# Patient Record
Sex: FEMALE | Race: BLACK OR AFRICAN AMERICAN | NOT HISPANIC OR LATINO | URBAN - METROPOLITAN AREA
[De-identification: names, ages, dates, MRNs, and addresses within clinical notes are randomized per-mention and may not be internally consistent; named-entity substitution may affect disease eponyms.]

---

## 2024-04-24 ENCOUNTER — APPOINTMENT (OUTPATIENT)
Dept: URGENT CARE | Facility: MEDICAL CENTER | Age: 24
End: 2024-04-24

## 2024-04-24 ENCOUNTER — APPOINTMENT (OUTPATIENT)
Dept: LAB | Facility: MEDICAL CENTER | Age: 24
End: 2024-04-24

## 2024-04-24 DIAGNOSIS — Z13.9 ENCOUNTER FOR HEALTH-RELATED SCREENING: ICD-10-CM

## 2024-04-24 DIAGNOSIS — Z13.9 ENCOUNTER FOR HEALTH-RELATED SCREENING: Primary | ICD-10-CM

## 2024-04-24 LAB — RUBV IGG SERPL IA-ACNC: 23.6 IU/ML

## 2024-04-24 PROCEDURE — 86480 TB TEST CELL IMMUN MEASURE: CPT

## 2024-04-24 PROCEDURE — 36415 COLL VENOUS BLD VENIPUNCTURE: CPT

## 2024-04-24 PROCEDURE — 86762 RUBELLA ANTIBODY: CPT

## 2024-04-24 PROCEDURE — 86735 MUMPS ANTIBODY: CPT

## 2024-04-24 PROCEDURE — 86787 VARICELLA-ZOSTER ANTIBODY: CPT

## 2024-04-24 PROCEDURE — 86706 HEP B SURFACE ANTIBODY: CPT

## 2024-04-24 PROCEDURE — 86765 RUBEOLA ANTIBODY: CPT

## 2024-04-24 PROCEDURE — 90715 TDAP VACCINE 7 YRS/> IM: CPT

## 2024-04-25 LAB
HBV SURFACE AB SER-ACNC: <3 MIU/ML
MEV IGG SER QL IA: NORMAL
MUV IGG SER QL IA: NORMAL
VZV IGG SER QL IA: ABNORMAL

## 2024-04-27 LAB
GAMMA INTERFERON BACKGROUND BLD IA-ACNC: 0.01 IU/ML
M TB IFN-G BLD-IMP: NEGATIVE
M TB IFN-G CD4+ BCKGRND COR BLD-ACNC: 0 IU/ML
M TB IFN-G CD4+ BCKGRND COR BLD-ACNC: 0 IU/ML
MITOGEN IGNF BCKGRD COR BLD-ACNC: 9.99 IU/ML

## 2024-10-31 ENCOUNTER — HOSPITAL ENCOUNTER (EMERGENCY)
Facility: HOSPITAL | Age: 24
Discharge: HOME | End: 2024-10-31
Attending: EMERGENCY MEDICINE | Admitting: EMERGENCY MEDICINE
Payer: COMMERCIAL

## 2024-10-31 VITALS
DIASTOLIC BLOOD PRESSURE: 68 MMHG | RESPIRATION RATE: 16 BRPM | OXYGEN SATURATION: 100 % | SYSTOLIC BLOOD PRESSURE: 130 MMHG | WEIGHT: 167.33 LBS | HEART RATE: 69 BPM | TEMPERATURE: 98.4 F

## 2024-10-31 DIAGNOSIS — G43.109 OCULAR MIGRAINE: Primary | ICD-10-CM

## 2024-10-31 LAB
ATRIAL RATE: 76 BPM
GLUCOSE SERPL-MCNC: 79 MG/DL (ref 65–140)
P AXIS: 42 DEGREES
PR INTERVAL: 134 MS
QRS AXIS: 47 DEGREES
QRSD INTERVAL: 76 MS
QT INTERVAL: 372 MS
QTC INTERVAL: 418 MS
T WAVE AXIS: 21 DEGREES
VENTRICULAR RATE: 76 BPM

## 2024-10-31 PROCEDURE — 93010 ELECTROCARDIOGRAM REPORT: CPT | Performed by: INTERNAL MEDICINE

## 2024-10-31 PROCEDURE — 96365 THER/PROPH/DIAG IV INF INIT: CPT

## 2024-10-31 PROCEDURE — 96372 THER/PROPH/DIAG INJ SC/IM: CPT

## 2024-10-31 PROCEDURE — 82948 REAGENT STRIP/BLOOD GLUCOSE: CPT

## 2024-10-31 PROCEDURE — 93005 ELECTROCARDIOGRAM TRACING: CPT

## 2024-10-31 PROCEDURE — 99285 EMERGENCY DEPT VISIT HI MDM: CPT | Performed by: EMERGENCY MEDICINE

## 2024-10-31 PROCEDURE — 96375 TX/PRO/DX INJ NEW DRUG ADDON: CPT

## 2024-10-31 PROCEDURE — 99284 EMERGENCY DEPT VISIT MOD MDM: CPT

## 2024-10-31 RX ORDER — MAGNESIUM SULFATE HEPTAHYDRATE 40 MG/ML
2 INJECTION, SOLUTION INTRAVENOUS ONCE
Status: COMPLETED | OUTPATIENT
Start: 2024-10-31 | End: 2024-10-31

## 2024-10-31 RX ORDER — DIPHENHYDRAMINE HYDROCHLORIDE 50 MG/ML
25 INJECTION INTRAMUSCULAR; INTRAVENOUS ONCE
Status: COMPLETED | OUTPATIENT
Start: 2024-10-31 | End: 2024-10-31

## 2024-10-31 RX ORDER — KETOROLAC TROMETHAMINE 30 MG/ML
30 INJECTION, SOLUTION INTRAMUSCULAR; INTRAVENOUS ONCE
Status: COMPLETED | OUTPATIENT
Start: 2024-10-31 | End: 2024-10-31

## 2024-10-31 RX ORDER — SUMATRIPTAN 6 MG/.5ML
6 INJECTION, SOLUTION SUBCUTANEOUS ONCE
Status: COMPLETED | OUTPATIENT
Start: 2024-10-31 | End: 2024-10-31

## 2024-10-31 RX ORDER — METOCLOPRAMIDE HYDROCHLORIDE 5 MG/ML
10 INJECTION INTRAMUSCULAR; INTRAVENOUS ONCE
Status: COMPLETED | OUTPATIENT
Start: 2024-10-31 | End: 2024-10-31

## 2024-10-31 RX ADMIN — SUMATRIPTAN 6 MG: 6 INJECTION, SOLUTION SUBCUTANEOUS at 14:43

## 2024-10-31 RX ADMIN — DIPHENHYDRAMINE HYDROCHLORIDE 25 MG: 50 INJECTION, SOLUTION INTRAMUSCULAR; INTRAVENOUS at 14:45

## 2024-10-31 RX ADMIN — MAGNESIUM SULFATE HEPTAHYDRATE 2 G: 40 INJECTION, SOLUTION INTRAVENOUS at 14:55

## 2024-10-31 RX ADMIN — SODIUM CHLORIDE 1000 ML: 0.9 INJECTION, SOLUTION INTRAVENOUS at 14:42

## 2024-10-31 RX ADMIN — METOCLOPRAMIDE 10 MG: 5 INJECTION, SOLUTION INTRAMUSCULAR; INTRAVENOUS at 14:48

## 2024-10-31 RX ADMIN — KETOROLAC TROMETHAMINE 30 MG: 30 INJECTION, SOLUTION INTRAMUSCULAR; INTRAVENOUS at 14:42

## 2024-10-31 NOTE — Clinical Note
Florentino Sen was seen and treated in our emergency department on 10/31/2024.    No restrictions            Diagnosis:     Florentino  may return to work on return date.    She may return on this date: 11/01/2024         If you have any questions or concerns, please don't hesitate to call.      Jace Putnam MD    ______________________________           _______________          _______________  Hospital Representative                              Date                                Time

## 2024-10-31 NOTE — ED PROVIDER NOTES
Time reflects when diagnosis was documented in both MDM as applicable and the Disposition within this note       Time User Action Codes Description Comment    10/31/2024  3:43 PM Jace Putnam Add [G43.109] Ocular migraine           ED Disposition       ED Disposition   Discharge    Condition   Stable    Date/Time   Thu Oct 31, 2024  3:43 PM    Comment   Fantadenia Mckinley discharge to home/self care.                   Assessment & Plan       Medical Decision Making  Acute headache and visual complaint-doubt retinal detachment, central retinal artery/vein occlusion, stroke, will consult neuro tx headache    Risk  Prescription drug management.        ED Course as of 10/31/24 1546   Thu Oct 31, 2024   1422 Stat neuro consult     1428 Case d/w dr. Davis of neurology. Doubt stroke. Likely ocular migraine, no imaging indicated at this time, will x for migraine reassess.       Medications   magnesium sulfate 2 g/50 mL IVPB (premix) 2 g (2 g Intravenous New Bag 10/31/24 1455)   ketorolac (TORADOL) injection 30 mg (30 mg Intravenous Given 10/31/24 1442)   metoclopramide (REGLAN) injection 10 mg (10 mg Intravenous Given 10/31/24 1448)   diphenhydrAMINE (BENADRYL) injection 25 mg (25 mg Intravenous Given 10/31/24 1445)   SUMAtriptan (IMITREX) subcutaneous injection 6 mg (6 mg Subcutaneous Given 10/31/24 1443)   sodium chloride 0.9 % bolus 1,000 mL (1,000 mL Intravenous New Bag 10/31/24 1442)       ED Risk Strat Scores       Stroke Assessment       Row Name 10/31/24 1425             NIH Stroke Scale    Interval Baseline      Level of Consciousness (1a.) 0      LOC Questions (1b.) 0      LOC Commands (1c.) 0      Best Gaze (2.) 0      Visual (3.) 0      Facial Palsy (4.) 0      Motor Arm, Left (5a.) 0      Motor Arm, Right (5b.) 0      Motor Leg, Left (6a.) 0      Motor Leg, Right (6b.) 0      Limb Ataxia (7.) 0      Sensory (8.) 0      Best Language (9.) 0      Dysarthria (10.) 0      Extinction and Inattention (11.) (Formerly  "Neglect) 0      Total 0                    Flowsheet Row Most Recent Value   Thrombolytic Decision Options    Thrombolytic Decision Patient not a candidate.   Patient is not a candidate options Symptoms resolved/clearly non disabling.                                                History of Present Illness       Chief Complaint   Patient presents with    Visual Field Change     Pt c/o vision changes that occurred today at 12:30 pm. Pt also c/o headache for the past x 3 days. Pt c/o some light sensitivity. Pt denies cp/sob/n/v/d or fevers       Past Medical History:   Diagnosis Date    Asthma       Past Surgical History:   Procedure Laterality Date    NO PAST SURGERIES        History reviewed. No pertinent family history.   Social History     Tobacco Use    Smoking status: Never    Smokeless tobacco: Never   Vaping Use    Vaping status: Never Used   Substance Use Topics    Alcohol use: Yes     Comment: socially    Drug use: Never      E-Cigarette/Vaping    E-Cigarette Use Never User       E-Cigarette/Vaping Substances      I have reviewed and agree with the history as documented.     pt is a 25 y/o F who presents for evaluaiton of sudden onset of visual disturbance that started suddenly 1.5 hours ago. Pt complains of an intermittnet stabbig frontal headache that wraps around the left eye, crosses her forehead and feels like it is directly behind the R eye. it is currently mild. she complains of change in her vision that started 1.5 hours ago. its described as \"staring at the sun then looking at an object and it not being clear\". she denies a true visual field deficit, and states that if she focuses really hard on one word she can read it but can't read the whole sentence. she has no eye pain photophobia tearing. this is present in both eyes. Denies other neuro complaints.      History provided by:  Patient      Review of Systems   Constitutional:  Negative for activity change, appetite change, fatigue and fever. "   HENT:  Negative for congestion, dental problem, ear pain, rhinorrhea and sore throat.    Eyes:  Positive for visual disturbance. Negative for photophobia, pain, discharge, redness and itching.   Respiratory:  Negative for chest tightness, shortness of breath and wheezing.    Cardiovascular:  Negative for chest pain and palpitations.   Gastrointestinal:  Negative for abdominal pain, blood in stool, constipation, diarrhea, nausea and vomiting.   Endocrine: Negative for cold intolerance and heat intolerance.   Genitourinary:  Negative for dysuria, frequency and hematuria.   Musculoskeletal:  Negative for arthralgias and myalgias.   Skin:  Negative for color change, pallor and rash.   Neurological:  Positive for headaches. Negative for weakness and numbness.   Hematological:  Does not bruise/bleed easily.   Psychiatric/Behavioral:  Negative for agitation, hallucinations and suicidal ideas.            Objective       ED Triage Vitals [10/31/24 1348]   Temperature Pulse Blood Pressure Respirations SpO2 Patient Position - Orthostatic VS   98.4 °F (36.9 °C) 78 133/60 18 100 % Lying      Temp Source Heart Rate Source BP Location FiO2 (%) Pain Score    Oral Monitor Right arm -- 3      Vitals      Date and Time Temp Pulse SpO2 Resp BP Pain Score FACES Pain Rating User   10/31/24 1457 -- -- -- -- -- 5 -- BA   10/31/24 1442 -- -- -- -- -- 5 -- BA   10/31/24 1348 98.4 °F (36.9 °C) 78 100 % 18 133/60 3 -- C            Physical Exam  Constitutional:       Appearance: She is well-developed.   HENT:      Head: Normocephalic and atraumatic.   Eyes:      General: Lids are normal. Vision grossly intact. Gaze aligned appropriately.      Extraocular Movements: Extraocular movements intact.      Conjunctiva/sclera: Conjunctivae normal.      Pupils: Pupils are equal, round, and reactive to light.      Visual Fields: Right eye visual fields normal and left eye visual fields normal.   Neck:      Vascular: No JVD.      Trachea: No tracheal  deviation.   Cardiovascular:      Rate and Rhythm: Normal rate and regular rhythm.   Pulmonary:      Effort: No tachypnea, accessory muscle usage or respiratory distress.      Breath sounds: Normal breath sounds.   Abdominal:      General: There is no distension.   Musculoskeletal:      Right lower leg: Normal.      Left lower leg: Normal.   Skin:     General: Skin is warm.      Capillary Refill: Capillary refill takes less than 2 seconds.   Neurological:      Mental Status: She is alert and oriented to person, place, and time.      Comments: Please refer to nih stroke scale   Psychiatric:         Behavior: Behavior normal.         Results Reviewed       Procedure Component Value Units Date/Time    Fingerstick Glucose (POCT) [[id number]]  (Normal) Collected: 10/31/24 1351    Lab Status: Final result Specimen: Blood Updated: 10/31/24 1352     POC Glucose 79 mg/dl             No orders to display       ECG 12 Lead Documentation Only    Date/Time: 10/31/2024 2:02 PM    Performed by: Jace Putnam MD  Authorized by: Jace Putnam MD    ECG reviewed by me, the ED Provider: yes    Patient location:  ED  Rate:     ECG rate:  76    ECG rate assessment: normal    Rhythm:     Rhythm: sinus rhythm    Ectopy:     Ectopy: none    QRS:     QRS axis:  Normal    QRS intervals:  Normal  Conduction:     Conduction: normal    ST segments:     ST segments:  Normal  T waves:     T waves: normal        ED Medication and Procedure Management   None     Patient's Medications    No medications on file       ED SEPSIS DOCUMENTATION   Time reflects when diagnosis was documented in both MDM as applicable and the Disposition within this note       Time User Action Codes Description Comment    10/31/2024  3:43 PM Jace Putnam Add [G43.109] Ocular migraine                  Jace Putnam MD  10/31/24 1545       Jace Putnam MD  10/31/24 1546

## 2025-07-08 ENCOUNTER — OFFICE VISIT (OUTPATIENT)
Age: 25
End: 2025-07-08
Payer: COMMERCIAL

## 2025-07-08 VITALS
OXYGEN SATURATION: 97 % | SYSTOLIC BLOOD PRESSURE: 114 MMHG | RESPIRATION RATE: 16 BRPM | HEIGHT: 65 IN | BODY MASS INDEX: 27.4 KG/M2 | HEART RATE: 96 BPM | TEMPERATURE: 97.6 F | WEIGHT: 164.46 LBS | DIASTOLIC BLOOD PRESSURE: 76 MMHG

## 2025-07-08 DIAGNOSIS — K08.89 PAIN, DENTAL: Primary | ICD-10-CM

## 2025-07-08 PROCEDURE — 99203 OFFICE O/P NEW LOW 30 MIN: CPT

## 2025-07-08 PROCEDURE — S9088 SERVICES PROVIDED IN URGENT: HCPCS

## 2025-07-08 RX ORDER — DOXYCYCLINE HYCLATE 50 MG/1
CAPSULE ORAL
COMMUNITY
Start: 2025-06-26

## 2025-07-08 RX ORDER — MELOXICAM 15 MG/1
15 TABLET ORAL DAILY
Qty: 10 TABLET | Refills: 0 | Status: SHIPPED | OUTPATIENT
Start: 2025-07-08 | End: 2025-07-18

## 2025-07-08 RX ORDER — TRETINOIN 0.25 MG/G
CREAM TOPICAL
COMMUNITY
Start: 2025-05-01

## 2025-07-08 RX ORDER — SUMATRIPTAN SUCCINATE 100 MG/1
50-100 TABLET ORAL
COMMUNITY
Start: 2025-06-27 | End: 2026-06-27

## 2025-07-08 NOTE — PATIENT INSTRUCTIONS
Patient Education     Tooth Extraction   Why is this procedure done?   Having a tooth pulled is another name for a tooth extraction. There are many reasons you may need to have this done. You may have:  Damaged your tooth so badly that it cannot be fixed.  A lot of decay in a tooth. Severe decay can cause infection in the nerves and blood vessels of your tooth.  Very bad gum disease.  An impacted tooth that is stuck in the gum, tissue, or bone.  Extra teeth that are blocking other teeth from coming in the right way.  Very crowded teeth. If so, teeth may need to be removed prior to braces.  Cancer in the mouth that has affected your teeth.  Cancer and need to have chemo or radiation.  Surgery coming up and your doctor wants to be sure there is no infection or teeth that could cause you pain or could slow your healing.       What will the results be?   If you have had pain from the tooth, it should go away after you are healed from the extraction. If your mouth was crowded, you will have more space in your mouth. Your doctor will be able to treat your condition once the necessary tooth or teeth are removed.  What happens before the procedure?   Your dentist will take your history. Talk to your dentist about all the drugs you are taking. Be sure to include all prescription and over-the-counter (OTC) drugs, and herbal supplements. Tell the dentist about any drug allergy. Bring a list of drugs you take with you.  Any bleeding problems. Be sure to tell your doctor if you are taking any drugs that may cause bleeding. Some of these are warfarin, rivaroxaban, apixaban, ticagrelor, clopidogrel, ketorolac, ibuprofen, naproxen, or aspirin. Certain vitamins and herbs, such as garlic and fish oil, may also add to the risk for bleeding. You may need to stop these drugs as well. Talk to your doctor about them.  Your dentist will study your teeth and take x-rays of the mouth.  In some cases, your dentist may give you drugs to  prevent infection or pain. You may need to take them before and after the procedure.  What happens during the procedure?   Your dentist will help you stay pain free and comfortable during the procedure.  You may get a shot to numb the mouth. Sometimes, you may have a drug to make you sleepy and pain free. Your dentist will talk with you about what will be best for your procedure.  The dentist will remove any gum tissue or bone that is in the way and gently remove the tooth. You should feel pressure, but not pain.  Blood will clot in the empty socket. Your dentist will put a gauze pad onto the socket.  Your dentist may need to place a few stitches to help you heal.  The procedure most often takes about 20 minutes, but may take longer.  What happens after the procedure?   Some bleeding is normal and should get less and less until it stops the next day.  You should bite firmly but gently on the gauze pad to stop the bleeding. Bite on the pad for 45 minutes. Replace it as it gets wet with your saliva. You may need to repeat these steps until the bleeding stops.  Your dentist may give you drugs to help with pain and prevent infection. Talk with your dentist after you have healed if you want to replace the tooth or teeth that were pulled.  What drugs may be needed?   The dentist may order drugs to:  Prevent or fight an infection  It is common to need only over the counter drugs to help with the soreness from a tooth extraction. Talk to the dentist about the best drugs to help you.  What problems could happen?   Infection  Bleeding  Numbness or nerve damage  Sinus perforations on upper tooth extractions if the tooth is close to the sinus  Damage to other teeth  Slow healing  Reaction to drugs  Sometimes, there is a problem with the blood clot in the empty socket. It may not form at all or it could break away too early. This is called a dry socket and can be very painful. Doing things that cause suction can make this more  likely to happen. Do not smoke, spit, or drink from a straw for the first day after your procedure.  Your gum may heal faster than your jaw bone. If this happens, you may feel sharp edges of the bone with your tongue. You may also notice small splinters of bone coming out as you heal. This is normal.  What can be done to prevent this health problem?   Brush your teeth after every meal or at least 2 times a day. Do not rush when you brush and spend 2 full minutes cleaning all of your teeth. Use a toothpaste with fluoride.  Use dental floss to clean between your teeth at least every day.  Try to stay away from foods and drinks that are high in sugar and starch, such as chocolate, sweets, cakes, and fizzy drinks that have sugar and acid.   See your dentist for regular cleaning and checkups.  Last Reviewed Date   2020-03-31  Consumer Information Use and Disclaimer   This generalized information is a limited summary of diagnosis, treatment, and/or medication information. It is not meant to be comprehensive and should be used as a tool to help the user understand and/or assess potential diagnostic and treatment options. It does NOT include all information about conditions, treatments, medications, side effects, or risks that may apply to a specific patient. It is not intended to be medical advice or a substitute for the medical advice, diagnosis, or treatment of a health care provider based on the health care provider's examination and assessment of a patient’s specific and unique circumstances. Patients must speak with a health care provider for complete information about their health, medical questions, and treatment options, including any risks or benefits regarding use of medications. This information does not endorse any treatments or medications as safe, effective, or approved for treating a specific patient. [employer] and its affiliates disclaim any warranty or liability relating to this information or the use  thereof. The use of this information is governed by the Terms of Use, available at https://www.[employer] Corporationuwer.com/en/know/clinical-effectiveness-terms   Copyright   Copyright © 2024 [employer] and its affiliates and/or licensors. All rights reserved.

## 2025-07-08 NOTE — PROGRESS NOTES
[facility]        NAME: Florentino Sen is a 25 y.o. female  : 2000    MRN: [id number]  DATE: 2025  TIME: 5:24 PM    Assessment and Plan   Pain, dental [K08.89]  1. Pain, dental        Ice recommended to the area post wisdom teeth removal. Alternate tylenol with Mobic prescription. Follow up with dentist for further care. ED if symptoms worsen.   Patient Instructions     Follow up with PCP in 3-5 days.  Proceed to ER if symptoms worsen.    If tests have been performed at [facility], our office will contact you with results if changes need to be made to the care plan discussed with you at the visit.  You can review your full results on [facility].    Chief Complaint     Chief Complaint   Patient presents with    Dental Pain     Got four wisdom teeth removed today. Having 8/10 pain primarily on left side, also pain on right side of mouth. Prescribed amoxicillin at orthodontist and instructed to take ibuprofen and tylenol for pain management.      History of Present Illness     Pt is a 25-year-old female presenting post wisdom teeth removal. She notes she has 4 wisdom teeth removed today.   She notes she has been taking amoxicillin, but was not given anything for pain control. She was advised to take tylenol and ibuprofen for pain management.   She notes 8/10 pain on the left side.   Denies any other symptoms.        Review of Systems   Review of Systems   Constitutional:  Negative for chills and fever.   HENT:  Positive for dental problem. Negative for sore throat and trouble swallowing.    Respiratory: Negative.  Negative for shortness of breath.    Gastrointestinal: Negative.    Genitourinary: Negative.    Musculoskeletal: Negative.    Skin: Negative.    Neurological:  Positive for headaches.     Current Medications     Current Medications[1]    Current Allergies     Allergies as of 2025    (No Known Allergies)            The following portions of the patient's history were  reviewed and updated as appropriate: allergies, current medications, past family history, past medical history, past social history, past surgical history and problem list.     Past Medical History[2]    Past Surgical History[3]    Family History[4]      Medications have been verified.        Objective   There were no vitals taken for this visit.  No LMP recorded.       Physical Exam     Physical Exam  Vitals and nursing note reviewed.   Constitutional:       General: She is not in acute distress.     Appearance: Normal appearance. She is normal weight. She is not ill-appearing, toxic-appearing or diaphoretic.   HENT:      Head: Normocephalic and atraumatic.      Right Ear: External ear normal.      Left Ear: External ear normal.      Nose: Nose normal.      Mouth/Throat:      Mouth: Mucous membranes are moist. No injury, lacerations, oral lesions or angioedema.      Dentition: Does not have dentures. Gingival swelling present. No dental tenderness, dental caries or dental abscesses.      Tongue: No lesions. Tongue does not deviate from midline.      Palate: No lesions.      Pharynx: Oropharynx is clear. No pharyngeal swelling, oropharyngeal exudate, posterior oropharyngeal erythema, uvula swelling or postnasal drip.        Comments: Swelling and mild bleeding noted to b/l molar extractions, no purulent drainage noted    Eyes:      Conjunctiva/sclera: Conjunctivae normal.       Cardiovascular:      Rate and Rhythm: Normal rate.      Pulses: Normal pulses.   Pulmonary:      Effort: Pulmonary effort is normal.      Breath sounds: Normal breath sounds.     Musculoskeletal:         General: No swelling. Normal range of motion.      Cervical back: Normal range of motion.     Skin:     General: Skin is warm and dry.      Capillary Refill: Capillary refill takes less than 2 seconds.     Neurological:      General: No focal deficit present.      Mental Status: She is alert. Mental status is at baseline.     Psychiatric:          Mood and Affect: Mood normal.         Behavior: Behavior normal.                        [1] No current outpatient medications on file.  [2]   Past Medical History:  Diagnosis Date    Asthma    [3]   Past Surgical History:  Procedure Laterality Date    NO PAST SURGERIES     [4] No family history on file.